# Patient Record
Sex: MALE | Race: BLACK OR AFRICAN AMERICAN | NOT HISPANIC OR LATINO
[De-identification: names, ages, dates, MRNs, and addresses within clinical notes are randomized per-mention and may not be internally consistent; named-entity substitution may affect disease eponyms.]

---

## 2022-06-24 ENCOUNTER — NON-APPOINTMENT (OUTPATIENT)
Age: 37
End: 2022-06-24

## 2022-06-24 PROBLEM — Z00.00 ENCOUNTER FOR PREVENTIVE HEALTH EXAMINATION: Status: ACTIVE | Noted: 2022-06-24

## 2022-06-30 ENCOUNTER — APPOINTMENT (OUTPATIENT)
Dept: PAIN MANAGEMENT | Facility: CLINIC | Age: 37
End: 2022-06-30

## 2022-08-18 ENCOUNTER — APPOINTMENT (OUTPATIENT)
Dept: NEUROLOGY | Facility: CLINIC | Age: 37
End: 2022-08-18
Payer: OTHER MISCELLANEOUS

## 2022-08-18 VITALS
WEIGHT: 290 LBS | SYSTOLIC BLOOD PRESSURE: 167 MMHG | DIASTOLIC BLOOD PRESSURE: 96 MMHG | HEART RATE: 97 BPM | HEIGHT: 73 IN | BODY MASS INDEX: 38.43 KG/M2

## 2022-08-18 PROCEDURE — 99214 OFFICE O/P EST MOD 30 MIN: CPT

## 2022-08-18 PROCEDURE — 99072 ADDL SUPL MATRL&STAF TM PHE: CPT

## 2022-08-18 NOTE — HISTORY OF PRESENT ILLNESS
[FreeTextEntry1] : Drake Champagne returns to the office for follow up and patient’s prior history and physical have been reviewed and he reports no change since last visit. He was initially seen with regards to lumbar radiculopathy sustained as result of work related injury on August 8, 2020. He has been doing chiropractor treatments over the last few weeks. It does offer temporary relief but unfortunately the symptoms returned the next day. He rates his pain 7 out of 10 but can go higher when doing extensive work. This affects his daily activity such as lifting, performing house chores. His walking has slightly improved, he can not walk a little more than 2 blocks at this time. Prolonged sitting worsens his pain. Patient follows up with pain management and had two rounds of lumbar epidural steroid injections with mild relief. \par \par Today, patient presents for neurologic revisit encounter regards to lumbar radiculopathy sustained as result of work related injury on August 8, 2020. The patient continues to have significant amount of pain. He rates his pain 7-8/10 on the pain scale. He was referred to neurosurgery but deferred, he wishes to proceed with conservative treatment at this time. He has been seeing Dr. Frey. He is set to undergo a bilateral L3-S1 medial branch block in the upcoming month. He has been trying Baclofen with good relief of pain.

## 2022-08-18 NOTE — PHYSICAL EXAM
[Person] : oriented to person [Place] : oriented to place [Time] : oriented to time [Concentration Intact] : normal concentrating ability [Visual Intact] : visual attention was ~T not ~L decreased [Naming Objects] : no difficulty naming common objects [Repeating Phrases] : no difficulty repeating a phrase [Writing A Sentence] : no difficulty writing a sentence [Fluency] : fluency intact [Comprehension] : comprehension intact [Reading] : reading intact [Past History] : adequate knowledge of personal past history [Cranial Nerves Optic (II)] : visual acuity intact bilaterally,  visual fields full to confrontation, pupils equal round and reactive to light [Cranial Nerves Oculomotor (III)] : extraocular motion intact [Cranial Nerves Trigeminal (V)] : facial sensation intact symmetrically [Cranial Nerves Facial (VII)] : face symmetrical [Cranial Nerves Vestibulocochlear (VIII)] : hearing was intact bilaterally [Cranial Nerves Glossopharyngeal (IX)] : tongue and palate midline [Cranial Nerves Accessory (XI - Cranial And Spinal)] : head turning and shoulder shrug symmetric [Cranial Nerves Hypoglossal (XII)] : there was no tongue deviation with protrusion [Motor Tone] : muscle tone was normal in all four extremities [Motor Strength] : muscle strength was normal in all four extremities [No Muscle Atrophy] : normal bulk in all four extremities [Sensation Tactile Decrease] : light touch was intact [Past-pointing] : there was no past-pointing [Tremor] : no tremor present [2+] : Ankle jerk left 2+ [Plantar Reflex Right Only] : normal on the right [Plantar Reflex Left Only] : normal on the left [FreeTextEntry6] :  pain limited weakness in the lower extremities pain bilateral, antalgic gait

## 2022-08-18 NOTE — ASSESSMENT
[FreeTextEntry1] : \par Drake Champagne returns to the office for follow up and patient’s prior history and physical have been reviewed and he reports no change since last visit. He was initially seen with regards to lumbar radiculopathy sustained as result of work related injury on August 8, 2020. He was referred to neurosurgery but deferred, he wishes to proceed with conservative treatment at this time. He has been seeing Dr. Frey. He is set to undergo a bilateral L3-S1 medial branch block in the upcoming month. He has been trying Baclofen with good relief of pain which he will continue with. He will continue with pain management and will consider neurosurgery.  \par \par \par  he is considered temporarily totally disabled and not recommended to go back to work at this time, and his symptom is causally related to the work related injury that he sustained.\par \par \par

## 2022-12-11 ENCOUNTER — FORM ENCOUNTER (OUTPATIENT)
Age: 37
End: 2022-12-11

## 2022-12-13 ENCOUNTER — FORM ENCOUNTER (OUTPATIENT)
Age: 37
End: 2022-12-13

## 2022-12-14 ENCOUNTER — FORM ENCOUNTER (OUTPATIENT)
Age: 37
End: 2022-12-14

## 2022-12-20 ENCOUNTER — APPOINTMENT (OUTPATIENT)
Dept: PAIN MANAGEMENT | Facility: CLINIC | Age: 37
End: 2022-12-20

## 2022-12-20 ENCOUNTER — APPOINTMENT (OUTPATIENT)
Dept: PAIN MANAGEMENT | Facility: CLINIC | Age: 37
End: 2022-12-20
Payer: OTHER MISCELLANEOUS

## 2022-12-20 ENCOUNTER — TRANSCRIPTION ENCOUNTER (OUTPATIENT)
Age: 37
End: 2022-12-20

## 2022-12-20 PROCEDURE — 00630 ANES PX LUMBAR REGION NOS: CPT | Mod: QZ,P2

## 2022-12-20 PROCEDURE — 93040 RHYTHM ECG WITH REPORT: CPT | Mod: 59

## 2022-12-20 PROCEDURE — 64495 INJ PARAVERT F JNT L/S 3 LEV: CPT | Mod: 50

## 2022-12-20 PROCEDURE — 64494 INJ PARAVERT F JNT L/S 2 LEV: CPT | Mod: 50

## 2022-12-20 PROCEDURE — 72100 X-RAY EXAM L-S SPINE 2/3 VWS: CPT

## 2022-12-20 PROCEDURE — 93770 DETERMINATION VENOUS PRESS: CPT

## 2022-12-20 PROCEDURE — 99072 ADDL SUPL MATRL&STAF TM PHE: CPT | Mod: QZ

## 2022-12-20 PROCEDURE — 99072 ADDL SUPL MATRL&STAF TM PHE: CPT

## 2022-12-20 PROCEDURE — 94761 N-INVAS EAR/PLS OXIMETRY MLT: CPT

## 2022-12-20 PROCEDURE — 64493 INJ PARAVERT F JNT L/S 1 LEV: CPT | Mod: 50

## 2022-12-20 NOTE — PROCEDURE
[FreeTextEntry3] : LUMBAR MEDIAL BRANCH INJECTION UNDER FLUOROSCOPY\par \par \par \par Date:  2022\par \par Patient: Leonard Juan\par \par :  1985\par \par ACC: [id number]\par \par \par \par Preoperative Diagnosis: Lumbar spondylosis; facet arthropathy L3-4, L4-5, L5-S1\par Postoperative Diagnosis: Same\par Procedure:\par                    1. Diagnostic Lumbar medial branch nerve injection Bilateral L3-4, L4-5, L5-S1\par                    2. Fluoroscopic guidance and localization of needle\par \par \par Physician: Trey Frey M.D.\par Anesthesiologist/CRNA: Ms Bernard\par Anesthesia: MAC /Versed 5 mg Fentanyl 50 mcg IVP\par Medical Necessity:  Failure of conservative management.\par \par \par \par Consent:  Though unusual, the possible complications including infection, bleeding, nerve damage, hospital admission, stroke, pneumothorax, death or failure of the procedure are theoretically possible. The patient was educated about the of the procedure and alternative therapies. All questions were answered and the patient freely gave consent to proceed.\par Indication for Fluoroscopy:  This procedure requires the precise placement of the spinal needle into the epidural space.  It is the only way to accurately and safely perform the injection.\par \par \par \par Procedure Note: \par After obtaining written consent, the patient was placed on the fluoroscopic table in the prone position with the pillow placed under the hips. A betadine prep was performed and the area surrounded by sterile drapes. A time out was performed.  Fluoroscopy unit was positioned over the patient and images of the spine (AP and oblique views) obtained.  The entry sites for the above noted levels median branch were determined and local anesthesia with lidocaine 2%-1cc was provided. At each level a 22guage 3 ½ inch spinal needle was placed at the level of the median branch to the facet under fluoroscopic guidance.  A dose of Lidocaine 2% 0.5cc was administered at each level the needles at each level were withdrawn following administration of the medication.  There was no significant bleeding at the site.  A dressing was placed by the assistant nurse. Contralateral side done in the same fashion.\par \par \par \par \par \par \par Complications: none. \par \par Disposition: : I have examined the patient and there are no new physical findings since the original presentation.  Sensory and motor function were intact. The patient met discharge criteria see nurses notes. The discharge instruction sheet was reviewed and given to the patient. The patient was discharged home with a .\par \par Comment: Immediate relief today : ADD PERCENTAGE. If at least 70% relief or 50% greater than 24 hours, would proceed to RFA. If 50% relief is less than 24 hours, would repeat to confirm for RFA. If less than 50% relief, assess for other pain generators. Call if any problems\par \par Indication for RFA: The pt had a positive response to medial branch diagnostic injections and is considered a good candidate for the thermal RF ablation procedure. The diagnostic injection provided at least 50% reduction in pain for the duration of the local anesthetic.\par \par \par \par The following criteria have been met: 1) failed response to at least 3 months of conservative management; 2) patient has LBP that is non-radicular, suggesting facet joint origin supported by absence of nerve root compression documented on the medical record on H&P and radiographic evaluation; 3) minimum of 6 months has elapsed since any prior RF treatments. If prior ablation therapy has been performed, it provided at least 50% relief for minimum of 10-12 weeks; 4) no prior spinal fusion at the vertebral level being treated;\par \par \par \par This document was signed by:\par \par Trey Frey MD \par Board Certified, Anesthesiology \par Board Certified, Pain Medicine \par \par

## 2023-01-03 ENCOUNTER — APPOINTMENT (OUTPATIENT)
Dept: PAIN MANAGEMENT | Facility: CLINIC | Age: 38
End: 2023-01-03
Payer: OTHER MISCELLANEOUS

## 2023-01-03 VITALS
WEIGHT: 290 LBS | DIASTOLIC BLOOD PRESSURE: 95 MMHG | HEART RATE: 71 BPM | HEIGHT: 73 IN | BODY MASS INDEX: 38.43 KG/M2 | SYSTOLIC BLOOD PRESSURE: 142 MMHG

## 2023-01-03 PROCEDURE — 99215 OFFICE O/P EST HI 40 MIN: CPT

## 2023-01-03 PROCEDURE — 99072 ADDL SUPL MATRL&STAF TM PHE: CPT

## 2023-01-03 NOTE — DATA REVIEWED
[FreeTextEntry1] : SOAPP: Scored a 0 , low risk.\par  \par [place] REGISTRY: Reviewed .  \par  \par UDS: No data obtained today. \par  \par Medications that trigger a UDS: Benzodiazepines (Ativan, Xanax, Valium) etc, Barbiturates, Narcotics (Avinza, Butrans, hydrocodone, Codeine, Zulema, Methadone, Morphine, MS Contin, Opana, oxycodone, Oxycontin, Suboxone etc), Pregabalin (Lyrica), Tramadol (Ultacet, Utram etc), Tapentadol, (Nucynta) and Elist Drugs (cocaine, THC, Etc.)\par  \par Risk factors: Bipolar Illness, positive for any an illicit drugs, history of any ETOH and drug abuse, any signs of diversion, Sharing Meds, selling meds. Non consistent [place] drug reporting and above 120meq of morphine\par  \par Low risk: Patient has combination of a low risk SOAP and no risk factors. UDS would be repeated randomly every quarter

## 2023-01-03 NOTE — ASSESSMENT
[FreeTextEntry1] : 37 year old male presenting with ongoing severe lower back pain. He has trialed and failed multiple injections. There may be a discogenic component. He is scheduled to see Dr. Valle in the end of January. He would like to see Dr. Valle first. Follow up in 6 weeks will be made for reassessment.\par \par Disability status:\par Temporary total disability. Patient is unable to return to work at this time.\par \par Entered by Elly Vargas, acting as scribe for Dr. Frey.\par  \par The documentation recorded by the scribe, in my presence, accurately reflects the service I personally performed, and the decisions made by me with my edits as appropriate.\par  \par Best Regards, \par Trey Frey MD \par Board Certified, Anesthesiology \par Board Certified, Pain Medicine\par \par

## 2023-01-03 NOTE — HISTORY OF PRESENT ILLNESS
[FreeTextEntry1] : HISTORY OF PRESENT ILLNESS, ORIGINAL: Mr. Juan is a 35 year old male presenting to establish care for his lower back pain. The pain started after a work related injury. 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 Since then, he has not returned to work. He has seen neurology and has had multiple medications with very minimal relief. He states the pain is severe, constant and rated 10/10 on the pain scale. He states the pain is associated with numbness and tingling into his right thigh as well as his lower extremities. He states he is unable to stand sit or walk. The patient has had this pain for 8 months. Patient describes the back pain as moderate to severe. During the last month the pain has been nearly constant with symptoms worsening evening. Pain described as sharp, pressure-like, dull/aching, throbbing. Pain is increased with standing, walking, exercising. Pain is decreased with lying down, relaxing. Pain is not changed with sitting, coughing/sneezing and bowel movements. Bowel or bladder habits have not changed.\par \par TODAY: Since last visit, he underwent a bilateral L3-S1 medial branch block on 12- with little to no relief. Today, he is presenting with ongoing severe lower back pain with radiation into the hamstring and legs. He notes some numbness and tingling as well. He states the pain is worse with sitting, standing or walking secondary to the pain. He states there is also pain with rotational movements. He currently rates the pain at a 8/10 on the pain scale. He states the pain is present daily.

## 2023-01-03 NOTE — PHYSICAL EXAM
[de-identified] : BACK - tenderness into the lumbar paraspinals. ROM restricted. Pain with extension. Positive facet loading bilaterally. Positive SLR bilaterally.

## 2023-01-15 ENCOUNTER — FORM ENCOUNTER (OUTPATIENT)
Age: 38
End: 2023-01-15

## 2023-01-25 ENCOUNTER — APPOINTMENT (OUTPATIENT)
Dept: NEUROSURGERY | Facility: CLINIC | Age: 38
End: 2023-01-25

## 2023-01-25 ENCOUNTER — APPOINTMENT (OUTPATIENT)
Dept: NEUROSURGERY | Facility: CLINIC | Age: 38
End: 2023-01-25
Payer: OTHER MISCELLANEOUS

## 2023-01-25 ENCOUNTER — APPOINTMENT (OUTPATIENT)
Dept: NEUROLOGY | Facility: CLINIC | Age: 38
End: 2023-01-25
Payer: OTHER MISCELLANEOUS

## 2023-01-25 ENCOUNTER — APPOINTMENT (OUTPATIENT)
Dept: NEUROLOGY | Facility: CLINIC | Age: 38
End: 2023-01-25

## 2023-01-25 VITALS
HEART RATE: 83 BPM | HEIGHT: 73 IN | BODY MASS INDEX: 39.76 KG/M2 | SYSTOLIC BLOOD PRESSURE: 152 MMHG | DIASTOLIC BLOOD PRESSURE: 97 MMHG | WEIGHT: 300 LBS

## 2023-01-25 VITALS — WEIGHT: 300 LBS | BODY MASS INDEX: 39.76 KG/M2 | HEIGHT: 73 IN

## 2023-01-25 DIAGNOSIS — Z78.9 OTHER SPECIFIED HEALTH STATUS: ICD-10-CM

## 2023-01-25 DIAGNOSIS — Z86.69 PERSONAL HISTORY OF OTHER DISEASES OF THE NERVOUS SYSTEM AND SENSE ORGANS: ICD-10-CM

## 2023-01-25 DIAGNOSIS — Z83.3 FAMILY HISTORY OF DIABETES MELLITUS: ICD-10-CM

## 2023-01-25 PROCEDURE — 99072 ADDL SUPL MATRL&STAF TM PHE: CPT

## 2023-01-25 PROCEDURE — 99214 OFFICE O/P EST MOD 30 MIN: CPT

## 2023-01-25 PROCEDURE — 99204 OFFICE O/P NEW MOD 45 MIN: CPT

## 2023-01-25 RX ORDER — IBUPROFEN 200 MG/1
200 TABLET ORAL
Refills: 0 | Status: ACTIVE | COMMUNITY

## 2023-01-25 RX ORDER — BACLOFEN 15 MG/1
TABLET ORAL
Refills: 0 | Status: ACTIVE | COMMUNITY

## 2023-01-25 NOTE — ASSESSMENT
[FreeTextEntry1] : lumbar radiculopathy- no further neurological testing at this time, follow up with neurosurgery since he has failed medical treatment.\par \par He is considered to have temporary total disability and is not recommended to go back to work at this time.\par \par I, Gloria Garcia, Attest that this documentation has been prepared under the direction and in the presence of Provider Aaron Rojas DO\par \par Thank You for letting me assist in the management of this patient. \par \par Aaron Rojas DO\kylee Board Certified, Neurology\par

## 2023-01-25 NOTE — ASSESSMENT
[FreeTextEntry1] : We have had a thorough discussion regarding his current condition, findings, and treatment options. Mr. APPIAH presents with persistent lower back pain with associated radiculopathy into the left leg.  He has trialed conservative management without improvement.  At this point he would like to consider surgical intervention.  I have recommended an updated MRI of the lumbar spine and dynamic x-rays of the lumbar spine for surgical planning.  Once the MRI and x-rays are completed he will follow-up with Dr. Valle to discuss further treatment options.  He is agreeable with our plan of care.  He will continue to see Dr. Frey and Dr. Rojas in the interim. All questions answered today.\par \par Jade Falcon, MS MANTILLA\par Hang Valle MD\par

## 2023-01-25 NOTE — HISTORY OF PRESENT ILLNESS
[de-identified] : Mr. APPIAH is status post work-related injury on 8/4/2020.  Since the injury he has had persistent mechanical lower back pain.  At times he will experience radiculopathy affecting his left leg.  He reports cramping distally in the left leg also.  Symptoms worsen when he gets up from a seated position or with bending.  Sitting he is relatively comfortable.\par \par He has trialed extensive physical therapy and interventional procedures with pain management.  He was seen by Dr. Frey, lumbar epidural injections followed by lumbar medial branch blocks were performed.  He had no relief with medial branch injections.  Epidurals gave him temporary relief. He is medically managed on baclofen and ibuprofen as needed. No bowel / bladder dysfunction.\par \par We have reviewed an MRI of the lumbar spine from stand-up MRI March 2021.  He is found to have a isolated degenerative disc at L5-S1.  No recent imaging.\par \par PHYSICAL EXAM: \par Constitutional: Well appearing, no distress\par HEENT: Normocephalic Atraumatic\par Psychiatric: Alert and oriented to all spheres, normal mood\par Pulmonary: No respiratory distress\par Neurologic: \par CN II-XII grossly intact\par Palpation: + Tenderness lumbar paraspinals.\par Strength: Full strength in all major muscle groups\par Sensation: Full sensation to light touch in all extremities\par Reflexes: \par  2+ patellar\par  2+ ankle jerk\par Restriction in range of motion lumbar spine.  Pain with flexion and extension.\par SLR negative\par Gait: steady, walking w/o assistance. \par \par

## 2023-01-25 NOTE — HISTORY OF PRESENT ILLNESS
[FreeTextEntry1] : Mr. YISSEL APPIAH returns to the office for follow-up and his prior history and physical have been reviewed and he reports no change since last visit.  he has been seeing us for  lumbar radiculopathy sustained as result of work related injury on August 8, 2022. He has not improved despite physical therapy and pain management injection/nerve blocks.  He still complains of daily back pain rated at 9-10/10.  he has been sent to neurosurgery and he wants to go ahead with surgery since medical treatment has not helpe.  he has not worked since the injury.

## 2023-02-13 ENCOUNTER — LABORATORY RESULT (OUTPATIENT)
Age: 38
End: 2023-02-13

## 2023-02-13 ENCOUNTER — APPOINTMENT (OUTPATIENT)
Dept: PAIN MANAGEMENT | Facility: CLINIC | Age: 38
End: 2023-02-13
Payer: OTHER MISCELLANEOUS

## 2023-02-13 VITALS — BODY MASS INDEX: 39.76 KG/M2 | HEIGHT: 73 IN | WEIGHT: 300 LBS

## 2023-02-13 PROCEDURE — 99215 OFFICE O/P EST HI 40 MIN: CPT

## 2023-02-13 PROCEDURE — 80104W: CUSTOM

## 2023-02-13 PROCEDURE — 99072 ADDL SUPL MATRL&STAF TM PHE: CPT

## 2023-02-13 PROCEDURE — 80305 DRUG TEST PRSMV DIR OPT OBS: CPT | Mod: QW

## 2023-02-13 RX ORDER — TRAMADOL HYDROCHLORIDE 50 MG/1
50 TABLET, COATED ORAL TWICE DAILY
Qty: 60 | Refills: 0 | Status: ACTIVE | COMMUNITY
Start: 2023-02-13 | End: 1900-01-01

## 2023-02-13 NOTE — HISTORY OF PRESENT ILLNESS
[FreeTextEntry1] : HISTORY OF PRESENT ILLNESS, ORIGINAL: Mr. Juan is a 35 year old male presenting to establish care for his lower back pain. The pain started after a work related injury. 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 Since then, he has not returned to work. He has seen neurology and has had multiple medications with very minimal relief. He states the pain is severe, constant and rated 10/10 on the pain scale. He states the pain is associated with numbness and tingling into his right thigh as well as his lower extremities. He states he is unable to stand sit or walk. The patient has had this pain for 8 months. Patient describes the back pain as moderate to severe. During the last month the pain has been nearly constant with symptoms worsening evening. Pain described as sharp, pressure-like, dull/aching, throbbing. Pain is increased with standing, walking, exercising. Pain is decreased with lying down, relaxing. Pain is not changed with sitting, coughing/sneezing and bowel movements. Bowel or bladder habits have not changed.\par \par TODAY: Since last visit, he continues with ongoing lower back pain. He states the pain is in the back with radiation into the lower extremities. He does note at times numbness and tingling into the legs. He states there is significant pain with standing or walking. \par \par He currently rates his pain anywhere from a 6 to 9/10 on the pain scale.  He states he recently saw neurosurgery and is pending imaging.  He has severe limitation of his ADLs.  He is very frustrated by his symptoms.

## 2023-02-13 NOTE — ASSESSMENT
[FreeTextEntry1] : 37-year-old male presenting with chronic lower back pain.  He has failed multiple injections, multiple medications and physical therapy.  At this time, we will trial tramadol 50 mg to be taken b.i.d..  I will also get a UDS today.  I will defer care to Neurosurgery for additional treatment.  He will follow-up in 2 months for reassessment.  \par \par Disability status:\par Temporary total disability. Patient is unable to return to work at this time.\par \par Entered by Elly Vargas, acting as scribe for Dr. Frey.\par  \par The documentation recorded by the scribe, in my presence, accurately reflects the service I personally performed, and the decisions made by me with my edits as appropriate.\par  \par Best Regards, \par Trey Frey MD \par Board Certified, Anesthesiology \par Board Certified, Pain Medicine\par \par

## 2023-02-13 NOTE — PHYSICAL EXAM
[de-identified] : BACK - tenderness into the lumbar paraspinals. ROM restricted. Pain with extension. Positive facet loading bilaterally. Positive SLR bilaterally.

## 2023-02-14 LAB
AMP / AMPHETAMINE: NEGATIVE
BAR / SECOBARBITAL: NEGATIVE
BUP / BUPRENORPHINE: NEGATIVE
BZO / OXAZEPAM: NEGATIVE
COC / COCAINE: NEGATIVE
CREATININE: 200 MG/DL
MDMA / METHYLENEDIOXYMETHAMPHETAMINE: NEGATIVE
MET / METHAMPHETAMINES: NEGATIVE
MOP / MORPHINE: NEGATIVE
MTD / METHADONE: NEGATIVE
OXY / OXYCODONE: NEGATIVE
PCP / PHENCYCLIDINE: NEGATIVE
PH: 7
SPECIFIC GRAVITY: 1.02
TEMPERATURE: 90 C
THC / MARIJUANA: NEGATIVE

## 2023-02-26 ENCOUNTER — OUTPATIENT (OUTPATIENT)
Dept: OUTPATIENT SERVICES | Facility: HOSPITAL | Age: 38
LOS: 1 days | End: 2023-02-26
Payer: COMMERCIAL

## 2023-02-26 ENCOUNTER — RESULT REVIEW (OUTPATIENT)
Age: 38
End: 2023-02-26

## 2023-02-26 DIAGNOSIS — M54.16 RADICULOPATHY, LUMBAR REGION: ICD-10-CM

## 2023-02-26 DIAGNOSIS — M47.816 SPONDYLOSIS WITHOUT MYELOPATHY OR RADICULOPATHY, LUMBAR REGION: ICD-10-CM

## 2023-02-26 PROCEDURE — 72148 MRI LUMBAR SPINE W/O DYE: CPT

## 2023-02-26 PROCEDURE — 72148 MRI LUMBAR SPINE W/O DYE: CPT | Mod: 26

## 2023-02-27 DIAGNOSIS — M47.816 SPONDYLOSIS WITHOUT MYELOPATHY OR RADICULOPATHY, LUMBAR REGION: ICD-10-CM

## 2023-02-27 DIAGNOSIS — M54.16 RADICULOPATHY, LUMBAR REGION: ICD-10-CM

## 2023-03-21 ENCOUNTER — APPOINTMENT (OUTPATIENT)
Dept: NEUROSURGERY | Facility: CLINIC | Age: 38
End: 2023-03-21
Payer: OTHER MISCELLANEOUS

## 2023-03-21 PROCEDURE — 99214 OFFICE O/P EST MOD 30 MIN: CPT

## 2023-03-21 NOTE — HISTORY OF PRESENT ILLNESS
[de-identified] : Mr. Juan is a 37 year old male presenting to establish care for his lower back pain. The pain started after a work related injury 08/04/2020. 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 Since then, he has not returned to work. Since the injury he has had persistent left sided lower back pain. At times he will experience radiculopathy affecting his posterior left leg. Symptoms worsen when he gets up from a seated position or with sitting for long periods of time. \par \par He has trialed extensive physical therapy and interventional procedures with pain management. He was seen by Dr. Frey, lumbar epidural injections followed by lumbar medial branch blocks were performed. He had no relief with medial branch injections. Epidurals gave him temporary relief. \par \par MRI Lumbar Spine 02/26/23: Degenerative disc at L5-S1 with little or no loss of disc height. Mild retrolisthesis at L5-S1. Mild to moderate foraminal stenosis at L5-S1, somewhat worse on the right than left.

## 2023-03-21 NOTE — DISCUSSION/SUMMARY
[de-identified] : Mr. Juan is a 37 year old male presenting to establish care for his lower back pain. The pain started after a work related injury 08/04/2020. Though there are some L5-S1 findings noted, he has undergone multiple lumbar injections without any relief. I am beginning to suspect pain stemming from the left SI joint region. I am referring him to pain management for possible diagnostic left SI joint injection to rule out left sacroiliitis. If successful RFA can be considered. I will see him back once completed. \par \par I, Quentin Sol, attest that this documentation has been prepared under the direction and in the presence of Provider Hang Valle MD\par  \par Thank you for allowing me to assist in the care of this patient. \par  \par Hang Valle MD\par \par Board Certified , Neurosurgeon\par \par

## 2023-03-21 NOTE — PHYSICAL EXAM
[de-identified] : Palpation: + Tenderness lumbar paraspinals.\par Strength: Full strength in all major muscle groups\par Sensation: Full sensation to light touch in all extremities\par Reflexes: \par  2+ patellar\par  2+ ankle jerk\par Restriction in range of motion lumbar spine. Pain with flexion and extension.\par SLR positive on the left\par Positive rubi sign on the left\par Positive william sign on the left \par Gait: steady, walking w/o assistance. \par

## 2023-03-22 ENCOUNTER — APPOINTMENT (OUTPATIENT)
Dept: NEUROLOGY | Facility: CLINIC | Age: 38
End: 2023-03-22

## 2023-04-11 ENCOUNTER — FORM ENCOUNTER (OUTPATIENT)
Age: 38
End: 2023-04-11

## 2023-04-11 ENCOUNTER — APPOINTMENT (OUTPATIENT)
Dept: PAIN MANAGEMENT | Facility: CLINIC | Age: 38
End: 2023-04-11
Payer: OTHER MISCELLANEOUS

## 2023-04-11 VITALS
WEIGHT: 300 LBS | BODY MASS INDEX: 39.76 KG/M2 | HEART RATE: 84 BPM | SYSTOLIC BLOOD PRESSURE: 136 MMHG | HEIGHT: 73 IN | DIASTOLIC BLOOD PRESSURE: 86 MMHG

## 2023-04-11 PROCEDURE — 99215 OFFICE O/P EST HI 40 MIN: CPT

## 2023-04-11 NOTE — ASSESSMENT
[FreeTextEntry1] : 37-year-old male presenting with signs and symptoms consistent with left sacroiliac joint dysfunction. Patient is presenting with mainly buttock pain with impairment in ADLs and functionality pointing to the left sacroiliac joint.  The pain has not responded to conservative care, including medications, stretching, as well as active modalities, such as physical therapy.  Imaging studies as well as physical exam findings corroborate the symptomatology and point to the sacroiliac joints.  We will proceed with diagnostic and therapeutic sacroiliac joint injection. Follow up in 6 weeks will be made for reassessment. I have explained the findings to the patient and all questions have been answered. \par \par Patient had pain on PSIS area, Kade’s positive and pelvic rocking positive. Patient has trialed rehab (Home exercise, physical therapy or chiropractic care) and medications. We will schedule a left diagnostic and therapeutic sacroiliac joint injection. If greater than 50% relief for greater than 30 minutes, would do a second left sacroiliac joint injection in 1-2 weeks. With greater than 50% relief for 6-8 weeks, a left sacroiliac joint injection could be repeated in 3 months vs RFA or referral to neurosurgeon. \par \par Risk, benefits, pros and cons of procedure were explained to the patient using models and diagrams and their questions were answered. \par \par \par The patient has severe anxiety of procedures that necessitates monitored anesthesia care (MAC). The procedure performed will be close to major nerves, arteries, and spinal cord and/or joint structures. Due to the proximity of these structures, we need the patient to be still during the procedure.  With the help of MAC, this will be safely achieved and decrease the risk of any complications.\par \par \par Disability status:\par Temporary total disability. Patient is unable to return to work at this time.\par \par Entered by Elly Vargas, acting as scribe for Dr. Frey.\par  \par The documentation recorded by the scribe, in my presence, accurately reflects the service I personally performed, and the decisions made by me with my edits as appropriate.\par  \par Best Regards, \par Trey Frey MD \par Board Certified, Anesthesiology \par Board Certified, Pain Medicine\par \par

## 2023-04-11 NOTE — HISTORY OF PRESENT ILLNESS
[FreeTextEntry1] : HISTORY OF PRESENT ILLNESS, ORIGINAL: Mr. Juan is a 35 year old male presenting to establish care for his lower back pain. The pain started after a work related injury. 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 Since then, he has not returned to work. He has seen neurology and has had multiple medications with very minimal relief. He states the pain is severe, constant and rated 10/10 on the pain scale. He states the pain is associated with numbness and tingling into his right thigh as well as his lower extremities. He states he is unable to stand sit or walk. The patient has had this pain for 8 months. Patient describes the back pain as moderate to severe. During the last month the pain has been nearly constant with symptoms worsening evening. Pain described as sharp, pressure-like, dull/aching, throbbing. Pain is increased with standing, walking, exercising. Pain is decreased with lying down, relaxing. Pain is not changed with sitting, coughing/sneezing and bowel movements. Bowel or bladder habits have not changed.\par \par TODAY: Since last visit, he continues with ongoing lower back pain. He recently saw Dr. Valle as was advised to get a SIJ injection.\par \par He continues today with ongoing severe lower back/buttock pain. He states the pain radiates into the groin and into the hamstrings. He has stiffness and spasms. He has difficulty with sitting or standing for extended periods of time. He rates the pain at a 9/10 on the pain scale.

## 2023-05-02 ENCOUNTER — APPOINTMENT (OUTPATIENT)
Dept: PAIN MANAGEMENT | Facility: CLINIC | Age: 38
End: 2023-05-02
Payer: OTHER MISCELLANEOUS

## 2023-05-02 PROCEDURE — 94761 N-INVAS EAR/PLS OXIMETRY MLT: CPT

## 2023-05-02 PROCEDURE — 93770 DETERMINATION VENOUS PRESS: CPT

## 2023-05-02 PROCEDURE — 93040 RHYTHM ECG WITH REPORT: CPT | Mod: 59

## 2023-05-02 PROCEDURE — 27096 INJECT SACROILIAC JOINT: CPT | Mod: LT

## 2023-05-02 PROCEDURE — 01992 ANES DX/THER NRV BLK&INJ PRN: CPT | Mod: QZ,P2

## 2023-05-02 PROCEDURE — 72200 X-RAY EXAM SI JOINTS: CPT

## 2023-05-02 NOTE — PROCEDURE
[FreeTextEntry1] : SACROILIAC JOINT INJECTION UNDER FLUOROSCOPY\par   [FreeTextEntry3] : SACROILIAC JOINT INJECTION UNDER FLUOROSCOPY\par \par \par \par \par Date:  2023\par \par Patient: Leonard Juan\par \par :  1985\par Preoperative Diagnosis: Left SIJ Arthropathy \par Procedure:  Left SIJ Injection under Fluoroscopy\par Physician: Trey Frey MD\par Anesthesiologist/CRNA: Mr. Combs \par Anesthesia: See nurses note/ MAC/cold spray IV Sedation versed 4mg, fentanyl 100mcg \par Medical Necessity:  Failure of conservative management.\par Indications:  The patient was admitted for a median branch injection for diagnostic purposes.  The patient was explained the technique, complications and rationale for the procedure and elected to proceed.\par Indication for Fluoroscopy:  This procedure requires the precise placement of the spinal needle.  It is the only way to accurately and safely perform the injection.\par \par \par \par \par \par CONSENT: The possible complications including infection, bleeding, nerve damage, Hospital admission, death or failure of the procedure; though unusual, are theoretically possible. The patient was educated about the of the procedure and alternative therapies. All questions were answered and the patient freely gave consent to proceed.\par \par \par \par \par \par Monitoring:  Patient had continuous blood pressure, EKG, and pulse oximetry throughout the case. See nurse's notes\par \par \par \par \par PROCEDURE NOTE: \par After obtaining written consent, the patient was placed on the fluoroscopic table in the prone position  A time out was performed.  Fluoroscopic guidance was used to isolate and identify the Left sacroiliac joint.  A medial to lateral oblique projection allowed separation of the anterior (lateral) and posterior (medial) joint space.  The sacrum and posterior iliac crest was prepped with chloraprep and draped in usual sterile fashion. Cold spray was used to localize the area. A 22-gauge 3.5 inch spinal needle was directed into the inferior aspect of the sacroiliac joint using a posterior approach in bull's eye fashion. The interosseous ligament was engaged which provoked responses consisting of her typical painful symptoms. A 2 cc total volume of lidocaine 2% and 1 ml dexamethasone 10mg was injected. Volumes were kept small-commensurate with the joint's capacity as determined by end-injection back pressure on the syringe. The needle was removed. \par \par \par \par \par Findings: SIJ spine AP and oblique views with x-ray degenerative changes noted.\par \par \par Complications: none.  \par \par Disposition: I have examined the patient and there are no new physical findings since the original presentation.  Sensory and motor function were intact. The patient met discharge criteria see nurses' notes. The discharge instruction sheet was reviewed and given to the patient. The patient was discharged home with a .\par \par Comment: If at least 70% relief or 50% greater than 24 hours, would proceed to RFA. If 50% relief is less than 24 hours, would repeat to confirm for RFA. If less than 50% relief, assess for other pain generators. Call if any problems\par \par \par \par \par Indication for RFA: The pt had a positive response to SIJ injections and is considered a good candidate for the thermal RF ablation procedure. The diagnostic injection provided at least 50% reduction in pain for the duration of the local anesthetic. \par \par \par \par \par \par The following criteria have been met: 1) failed response to at least 3 months of conservative management; 2) patient has LBP/SIJ pain that is non-radicular, suggesting SIJ joint origin supported by absence of nerve root compression documented on the medical record on H&P and radiographic evaluation; 3) minimum of 6 months has elapsed since any prior RF treatments. If prior ablation therapy has been performed, it provided at least 50% relief for minimum of 10-12 weeks; 4) no prior spinal fusion at the vertebral level being treated;\par \par \par \par \par This document was signed by:\par \par Trey Frey MD  \par Board Certified, Anesthesiology  \par Board Certified, Pain Medicine  \par \par

## 2023-05-31 ENCOUNTER — FORM ENCOUNTER (OUTPATIENT)
Age: 38
End: 2023-05-31

## 2023-06-13 ENCOUNTER — APPOINTMENT (OUTPATIENT)
Dept: PAIN MANAGEMENT | Facility: CLINIC | Age: 38
End: 2023-06-13
Payer: OTHER MISCELLANEOUS

## 2023-06-13 ENCOUNTER — FORM ENCOUNTER (OUTPATIENT)
Age: 38
End: 2023-06-13

## 2023-06-13 VITALS — BODY MASS INDEX: 39.76 KG/M2 | WEIGHT: 300 LBS | HEIGHT: 73 IN

## 2023-06-13 PROCEDURE — 99214 OFFICE O/P EST MOD 30 MIN: CPT

## 2023-06-13 NOTE — PHYSICAL EXAM
[de-identified] : BACK - tenderness over the SIJ. ROM restricted. Pain with extension. Positive PSIS. Positive patricks test. Positive thigh thrust.

## 2023-06-13 NOTE — HISTORY OF PRESENT ILLNESS
[FreeTextEntry1] : HISTORY OF PRESENT ILLNESS, ORIGINAL: Mr. Juan is a 35 year old male presenting to establish care for his lower back pain. The pain started after a work related injury. 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 Since then, he has not returned to work. He has seen neurology and has had multiple medications with very minimal relief. He states the pain is severe, constant and rated 10/10 on the pain scale. He states the pain is associated with numbness and tingling into his right thigh as well as his lower extremities. He states he is unable to stand sit or walk. The patient has had this pain for 8 months. Patient describes the back pain as moderate to severe. During the last month the pain has been nearly constant with symptoms worsening evening. Pain described as sharp, pressure-like, dull/aching, throbbing. Pain is increased with standing, walking, exercising. Pain is decreased with lying down, relaxing. Pain is not changed with sitting, coughing/sneezing and bowel movements. Bowel or bladder habits have not changed.\par \par TODAY: Since last visit, he underwent a left sacroiliac joint injection on 5-2-2023 With approximately 90% relief for 3 weeks.  He states he was able to sit and sleep better after this injection.  Currently, he states his left buttock pain is returned.  He currently rates the pain as an 8/10 on the pain scale.  He states the pain goes into the left groin.  He states he has numbness and tingling over the left buttock.  He continues home exercises.\par

## 2023-06-13 NOTE — ASSESSMENT
[FreeTextEntry1] : 37-year-old male presenting status post left sacroiliac joint injection with approximately 90% relief for 3 weeks.  He states he was able to sit and sleep better following this procedure.  Currently, he states his left buttock pain is returned.  I will now proceed with a left sacroiliac joint radiofrequency ablation with sedation.  He will follow-up in 6 weeks for reassessment.\par \par \par Risk, benefits, pros and cons of procedure were explained to the patient using models and diagrams and their questions were answered. \par \par \par The patient has severe anxiety of procedures that necessitates monitored anesthesia care (MAC). The procedure performed will be close to major nerves, arteries, and spinal cord and/or joint structures. Due to the proximity of these structures, we need the patient to be still during the procedure.  With the help of MAC, this will be safely achieved and decrease the risk of any complications.\par \par \par Disability status:\par Temporary total disability. Patient is unable to return to work at this time.\par \par Entered by Elly Vargas, acting as scribe for Dr. Frey.\par  \par The documentation recorded by the scribe, in my presence, accurately reflects the service I personally performed, and the decisions made by me with my edits as appropriate.\par  \par Best Regards, \par Trey Frey MD \par Board Certified, Anesthesiology \par Board Certified, Pain Medicine\par \par

## 2023-07-05 ENCOUNTER — APPOINTMENT (OUTPATIENT)
Dept: PAIN MANAGEMENT | Facility: CLINIC | Age: 38
End: 2023-07-05
Payer: OTHER MISCELLANEOUS

## 2023-07-05 PROCEDURE — 01992 ANES DX/THER NRV BLK&INJ PRN: CPT | Mod: QZ,P2

## 2023-07-05 PROCEDURE — 64636Z: CUSTOM

## 2023-07-05 PROCEDURE — 94761 N-INVAS EAR/PLS OXIMETRY MLT: CPT

## 2023-07-05 PROCEDURE — 93770 DETERMINATION VENOUS PRESS: CPT

## 2023-07-05 PROCEDURE — 64635 DESTROY LUMB/SAC FACET JNT: CPT | Mod: LT

## 2023-07-05 PROCEDURE — 93040 RHYTHM ECG WITH REPORT: CPT | Mod: 59

## 2023-07-05 PROCEDURE — 72100 X-RAY EXAM L-S SPINE 2/3 VWS: CPT

## 2023-07-05 NOTE — PROCEDURE
[FreeTextEntry1] : Sacroiliac Radiofrequency ablation under fluoroscopy [FreeTextEntry3] :  Date:  2023\par \par Patient: Leonard Juan\par \par :  1985\par \par \par \par \par \par Preoperative diagnosis:  Left Sacroilitis\par Postoperative Diagnosis: Same\par Procedure:   Left medial nerve branch Radiofrequency Ablation for sacroiliac denervation under fluoroscopy.\par Physician: Trey Frey MD \par Anesthesiologist/CRNA: Ms. Farr\par Anesthesia:. MAC, Versed 4mg Fent 150mcg\par Medical necessity: Failure of conservative medical management\par Indication for Fluoroscopy:  This procedure requires the precise placement of the spinal needle into the epidural space.  It is the only way to accurately and safely perform the injection.\par \par \par \par Consent:  Though unusual, the possible complications including infection, bleeding, nerve damage, hospital admission, stroke, death or failure of the procedure are theoretically possible. The patient was educated about the of the procedure and alternative therapies. All questions were answered and the patient freely gave consent to proceed.\par Prior to the procedure, we discussed the risks of the radiofrequency such as hematoma, infection, and nerve or spinal cord injury. The patient was also told that sometimes patients will notice lower extremity weakness immediately following the procedure due to extravasation of local anesthetic solution onto the main nerve root during the procedure. In addition, the patient was informed that 1 to 2 weeks of perioperative discomfort following the procedure is to be expected.\par \par \par \par Monitoring:  Patient had continuous blood pressure, EKG, and pulse oximetry throughout the case. See nurse's notes.\par \par \par \par PROCEDURE NOTE: \par After obtaining written consent, patient was placed in a prone position on a fluoroscopy table.  The back was prepped and draped in a sterile fashion. A time out was performed. The [employer]  mm disposable Aravind*-coated cannula with a 5 mm active tip was adjusted via the sizing collar so that the electrode fit just inside the inner bevel at the end of the bare metal. Prior to beginning the procedure, the internal test mode function of the radiofrequency unit was checked to make sure the machine was functioning properly.\par   \par Fluoroscopic guidance was used to isolate and identify the left sacroiliac joint.  The skin was prepped.  At each target site, a 1% Lidocaine solution is used to anesthetize the skin and subcutaneous tissues. The radiofrequency cannulas were then placed at the L5 medial branch and the S1 and S2 and S3 posterior primary rami. Sensory stimulation at 50 hertz was performed at each target area. Referral of the sensory stimulation was noted at less than 1 volt over the paravertebral area or hip. Motor dissociation at 2 hertz was obtained by stimulating up to 3 times the voltage of the sensory stimulation and insuring a lack of motor movement in the lower extremities.\par \par Once the radiofrequency cannula were in correct position, a 2% Lidocaine solution was used to rapidly anesthetize the lesion site. After a thirty second delay, thermal lesions were then created at each level for a duration of 90 seconds at a temperature of 80° C.  A 3 cc volume of 2 lidocaine (2 cc) and depomedrol 40mg (1 cc) was then injected through the needle into the joint and the needle was removed. Sterile bandages are placed at the puncture sites. The patient tolerated the procedure well.  The patient's back was cleaned, and she was placed in the sitting position. \par \par \par \par Findings: Sacroiliac spine AP and oblique views with x-ray degenerative changes noted.\par \par Complications: None. The patient tolerated the procedure well.\par \par Disposition: I have examined the patient and there are no new physical findings since the original presentation.  Sensory and motor function were intact. The patient met discharge criteria see nurses' notes. The discharge instruction sheet was reviewed and given to the patient. The patient was discharged home with a . \par \par Comment: RFA today, follow up in 2-4 weeks. Call if any problem.\par \par \par \par Indication for RFA: The pt had a positive response to medial branch diagnostic injections and is considered a good candidate for the thermal RF ablation procedure. The diagnostic injection provided at least 50% reduction in pain for the duration of the local anesthetic. \par \par \par \par The following criteria have been met: 1) failed response to at least 3 months of conservative management; 2) patient has LBP/SIJ that is non-radicular, suggesting facet joint origin supported by absence of nerve root compression documented on the medical record on H&P and radiographic evaluation; 3) minimum of 6 months has elapsed since any prior RF treatments. If prior ablation therapy has been performed, it provided at least 50% relief for minimum of 10-12 weeks; 4) no prior spinal fusion at the vertebral level being treated;\par \par \par \par \par Trey Frey MD \par Board Certified, Anesthesiology \par Board Certified, Pain Medicine  \par

## 2023-07-18 ENCOUNTER — APPOINTMENT (OUTPATIENT)
Dept: PAIN MANAGEMENT | Facility: CLINIC | Age: 38
End: 2023-07-18
Payer: OTHER MISCELLANEOUS

## 2023-07-18 PROCEDURE — 99075 MEDICAL TESTIMONY: CPT

## 2023-07-26 ENCOUNTER — APPOINTMENT (OUTPATIENT)
Dept: PAIN MANAGEMENT | Facility: CLINIC | Age: 38
End: 2023-07-26

## 2023-08-07 ENCOUNTER — APPOINTMENT (OUTPATIENT)
Dept: PAIN MANAGEMENT | Facility: CLINIC | Age: 38
End: 2023-08-07
Payer: OTHER MISCELLANEOUS

## 2023-08-07 DIAGNOSIS — M47.816 SPONDYLOSIS W/OUT MYELOPATHY OR RADICULOPATHY, LUMBAR REGION: ICD-10-CM

## 2023-08-07 PROCEDURE — 99215 OFFICE O/P EST HI 40 MIN: CPT

## 2023-08-07 NOTE — ASSESSMENT
[FreeTextEntry1] : 38 year old male presenting with lumbar facet mediated pain. Patient is presenting with mainly axial pain with impairment in ADLs and functionality pointing to facet joints.  The pain has not responded to conservative care, including medications, stretching, as well as active modalities, such as physical therapy.  Imaging studies as well as physical exam findings corroborate the symptomatology and point to the facet joints.  We will proceed with diagnostic medial branch blocks. Follow up in 6 weeks will be made for reassessment. I have explained the findings to the patient and all questions have been answered.   Patient had paravertebral tenderness and pain on spinal movement with facet loading. Patient has trialed rehab (Home exercise, physical therapy or chiropractic care) and medications.   Schedule a bilateral diagnostic lumbar medial branch injection L3-S1, if 70% immediate relief for greater than 30 minutes or 50% greater than 24 hours, would schedule RFA at  lumbar medial branches.  If greater than 50% intermediate relief for 30 minutes would schedule a second diagnostic lumbar medial branch block, and if greater than 50% intermediate relief for 30 minutes, would schedule a RFA at lumbar medial branches.   Risk, benefits, pros and cons of procedure were explained to the patient using models and diagrams and their questions were answered.    The patient has severe anxiety of procedures that necessitates monitored anesthesia care (MAC). The procedure performed will be close to major nerves, arteries, and spinal cord and/or joint structures. Due to the proximity of these structures, we need the patient to be still during the procedure.  With the help of MAC, this will be safely achieved and decrease the risk of any complications.  Disability status: Temporary total disability. Patient is unable to return to work at this time.    Entered by Elly Vargas, acting as scribe for Dr. Frey.   The documentation recorded by the scribe, in my presence, accurately reflects the service I personally performed, and the decisions made by me with my edits as appropriate.   Best Regards,  Trey Frey MD  Board Certified, Anesthesiology  Board Certified, Pain Medicine

## 2023-08-07 NOTE — HISTORY OF PRESENT ILLNESS
[FreeTextEntry1] : HISTORY OF PRESENT ILLNESS, ORIGINAL: Mr. Juan is a 35 year old male presenting to establish care for his lower back pain. The pain started after a work related injury. 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 Since then, he has not returned to work. He has seen neurology and has had multiple medications with very minimal relief. He states the pain is severe, constant and rated 10/10 on the pain scale. He states the pain is associated with numbness and tingling into his right thigh as well as his lower extremities. He states he is unable to stand sit or walk. The patient has had this pain for 8 months. Patient describes the back pain as moderate to severe. During the last month the pain has been nearly constant with symptoms worsening evening. Pain described as sharp, pressure-like, dull/aching, throbbing. Pain is increased with standing, walking, exercising. Pain is decreased with lying down, relaxing. Pain is not changed with sitting, coughing/sneezing and bowel movements. Bowel or bladder habits have not changed.  TODAY: Since last visit, he underwent a left sacroiliac joint radiofrequency ablation on 7/5/2023. He had approximately 50% relief.  Currently, he states he has mainly axial lower back pain.  He currently rates pain as a 9 out of 10 on the pain scale.  He states pain is across the lower back only without any radicular component.  He states there is severe stiffness and spasms.  He has no radiating symptoms.  He states pain limits him from rotating in any direction.

## 2023-08-07 NOTE — PHYSICAL EXAM
[de-identified] : BACK - tenderness into the lumbar paraspinals. ROM restricted. Pain with extension. Positive facet loading.

## 2023-08-10 ENCOUNTER — APPOINTMENT (OUTPATIENT)
Dept: PAIN MANAGEMENT | Facility: CLINIC | Age: 38
End: 2023-08-10
Payer: OTHER MISCELLANEOUS

## 2023-08-10 PROCEDURE — 64493 INJ PARAVERT F JNT L/S 1 LEV: CPT | Mod: RT

## 2023-08-10 PROCEDURE — 94761 N-INVAS EAR/PLS OXIMETRY MLT: CPT

## 2023-08-10 PROCEDURE — 93770 DETERMINATION VENOUS PRESS: CPT

## 2023-08-10 PROCEDURE — 00630 ANES PX LUMBAR REGION NOS: CPT | Mod: QZ,P2

## 2023-08-10 PROCEDURE — 93040 RHYTHM ECG WITH REPORT: CPT | Mod: 59

## 2023-08-10 PROCEDURE — 72100 X-RAY EXAM L-S SPINE 2/3 VWS: CPT

## 2023-08-10 PROCEDURE — 64494 INJ PARAVERT F JNT L/S 2 LEV: CPT | Mod: RT

## 2023-08-10 PROCEDURE — 64495 INJ PARAVERT F JNT L/S 3 LEV: CPT | Mod: RT

## 2023-08-10 NOTE — PROCEDURE
[FreeTextEntry1] : LUMBAR MEDIAL BRANCH INJECTION UNDER FLUOROSCOPY [FreeTextEntry3] : LUMBAR MEDIAL BRANCH INJECTION UNDER FLUOROSCOPY      Preoperative Diagnosis: Lumbar spondylosis; facet arthropathy Bilateral L3-4, L4-5, L5-S1 Postoperative Diagnosis: Same Procedure:                    1. Diagnostic Lumbar medial branch nerve injection Bilateral L3-4, L4-5, L5-S1                    2. Fluoroscopic guidance and localization of needle   Physician: Trey Frey M.D. Anesthesiologist/CRNA: Verena Baltazar  Anesthesia: See nurses note. MAC/Cold spray/ Versed 4mg, Fentanyl 100mcg  Medical Necessity:  Failure of conservative management.    Consent:  Though unusual, the possible complications including infection, bleeding, nerve damage, hospital admission, stroke, pneumothorax, death or failure of the procedure are theoretically possible. The patient was educated about the of the procedure and alternative therapies. All questions were answered and the patient freely gave consent to proceed. Indication for Fluoroscopy:  This procedure requires the precise placement of the spinal needle into the epidural space.  It is the only way to accurately and safely perform the injection.    Procedure Note:  After obtaining written consent, the patient was placed on the fluoroscopic table in the prone position with the pillow placed under the hips. A chloraprep was performed and the area surrounded by sterile drapes. A time out was performed.  Fluoroscopy unit was positioned over the patient and images of the spine (AP and oblique views) obtained.  The entry sites for the above noted levels median branch were determined and local anesthesia with lidocaine 2%-1cc was provided. At each level a 22guage 3  inch spinal needle was placed at the level of the median branch to the facet under fluoroscopic guidance.  A dose of Lidocaine 2% 0.5cc was administered at each level the needles at each level were withdrawn following administration of the medication.  There was no significant bleeding at the site.  A dressing was placed by the assistant nurse.    Findings: Lumbar spine AP and oblique views with x-ray degenerative changes noted.   Complications: none.   Disposition: : I have examined the patient and there are no new physical findings since the original presentation.  Sensory and motor function were intact. The patient met discharge criteria see nurses notes. The discharge instruction sheet was reviewed and given to the patient. The patient was discharged home with a .  Comment:. If at least 70% relief or 50% greater than 24 hours, would proceed to RFA. If 50% relief is less than 24 hours, would repeat to confirm for RFA. If less than 50% relief, assess for other pain generators. Call if any problems  Indication for RFA: The pt had a positive response to medial branch diagnostic injections and is considered a good candidate for the thermal RF ablation procedure. The diagnostic injection provided at least 50% reduction in pain for the duration of the local anesthetic.    The following criteria have been met: 1) failed response to at least 3 months of conservative management; 2) patient has LBP that is non-radicular, suggesting facet joint origin supported by absence of nerve root compression documented on the medical record on H&P and radiographic evaluation; 3) minimum of 6 months has elapsed since any prior RF treatments. If prior ablation therapy has been performed, it provided at least 50% relief for minimum of 10-12 weeks; 4) no prior spinal fusion at the vertebral level being treated;   Trey Frey MD  Board Certified, Anesthesiology  Board Certified, Pain Medicine

## 2023-09-12 ENCOUNTER — APPOINTMENT (OUTPATIENT)
Dept: PAIN MANAGEMENT | Facility: CLINIC | Age: 38
End: 2023-09-12
Payer: OTHER MISCELLANEOUS

## 2023-09-12 VITALS — BODY MASS INDEX: 39.76 KG/M2 | WEIGHT: 300 LBS | HEIGHT: 73 IN

## 2023-09-12 DIAGNOSIS — M51.36 OTHER INTERVERTEBRAL DISC DEGENERATION, LUMBAR REGION: ICD-10-CM

## 2023-09-12 PROCEDURE — 99455 WORK RELATED DISABILITY EXAM: CPT

## 2023-09-13 ENCOUNTER — APPOINTMENT (OUTPATIENT)
Dept: ORTHOPEDIC SURGERY | Facility: CLINIC | Age: 38
End: 2023-09-13
Payer: OTHER MISCELLANEOUS

## 2023-09-13 PROCEDURE — 99203 OFFICE O/P NEW LOW 30 MIN: CPT

## 2023-09-27 ENCOUNTER — APPOINTMENT (OUTPATIENT)
Dept: PAIN MANAGEMENT | Facility: CLINIC | Age: 38
End: 2023-09-27
Payer: OTHER MISCELLANEOUS

## 2023-09-27 VITALS
BODY MASS INDEX: 39.76 KG/M2 | DIASTOLIC BLOOD PRESSURE: 93 MMHG | WEIGHT: 300 LBS | SYSTOLIC BLOOD PRESSURE: 136 MMHG | HEIGHT: 73 IN | HEART RATE: 81 BPM

## 2023-09-27 PROCEDURE — 99215 OFFICE O/P EST HI 40 MIN: CPT

## 2023-10-26 ENCOUNTER — APPOINTMENT (OUTPATIENT)
Dept: PAIN MANAGEMENT | Facility: CLINIC | Age: 38
End: 2023-10-26
Payer: OTHER MISCELLANEOUS

## 2023-10-26 PROCEDURE — 93040 RHYTHM ECG WITH REPORT: CPT | Mod: 59

## 2023-10-26 PROCEDURE — 27096 INJECT SACROILIAC JOINT: CPT | Mod: LT

## 2023-10-26 PROCEDURE — 93770 DETERMINATION VENOUS PRESS: CPT

## 2023-10-26 PROCEDURE — 99152Z: CUSTOM

## 2023-10-26 PROCEDURE — 01992 ANES DX/THER NRV BLK&INJ PRN: CPT | Mod: QZ,P2

## 2023-10-26 PROCEDURE — 72200 X-RAY EXAM SI JOINTS: CPT

## 2023-11-06 ENCOUNTER — APPOINTMENT (OUTPATIENT)
Dept: PAIN MANAGEMENT | Facility: CLINIC | Age: 38
End: 2023-11-06
Payer: OTHER MISCELLANEOUS

## 2023-11-06 VITALS
HEART RATE: 85 BPM | WEIGHT: 300 LBS | DIASTOLIC BLOOD PRESSURE: 84 MMHG | SYSTOLIC BLOOD PRESSURE: 128 MMHG | BODY MASS INDEX: 39.76 KG/M2 | HEIGHT: 73 IN

## 2023-11-06 PROCEDURE — 99214 OFFICE O/P EST MOD 30 MIN: CPT

## 2023-11-15 ENCOUNTER — APPOINTMENT (OUTPATIENT)
Dept: PAIN MANAGEMENT | Facility: CLINIC | Age: 38
End: 2023-11-15
Payer: OTHER MISCELLANEOUS

## 2023-11-15 PROCEDURE — 64636Z: CUSTOM

## 2023-11-15 PROCEDURE — 72100 X-RAY EXAM L-S SPINE 2/3 VWS: CPT

## 2023-11-15 PROCEDURE — 01992 ANES DX/THER NRV BLK&INJ PRN: CPT | Mod: QZ,P1

## 2023-11-15 PROCEDURE — 93770 DETERMINATION VENOUS PRESS: CPT

## 2023-11-15 PROCEDURE — 99152Z: CUSTOM

## 2023-11-15 PROCEDURE — 93040 RHYTHM ECG WITH REPORT: CPT | Mod: 59

## 2023-11-15 PROCEDURE — 64635 DESTROY LUMB/SAC FACET JNT: CPT | Mod: LT

## 2023-12-14 ENCOUNTER — APPOINTMENT (OUTPATIENT)
Dept: PAIN MANAGEMENT | Facility: CLINIC | Age: 38
End: 2023-12-14

## 2023-12-26 ENCOUNTER — APPOINTMENT (OUTPATIENT)
Dept: PAIN MANAGEMENT | Facility: CLINIC | Age: 38
End: 2023-12-26

## 2024-01-26 ENCOUNTER — APPOINTMENT (OUTPATIENT)
Dept: PAIN MANAGEMENT | Facility: CLINIC | Age: 39
End: 2024-01-26
Payer: OTHER MISCELLANEOUS

## 2024-01-26 VITALS — BODY MASS INDEX: 39.76 KG/M2 | HEIGHT: 73 IN | WEIGHT: 300 LBS

## 2024-01-26 DIAGNOSIS — M53.3 SACROCOCCYGEAL DISORDERS, NOT ELSEWHERE CLASSIFIED: ICD-10-CM

## 2024-01-26 PROCEDURE — 99214 OFFICE O/P EST MOD 30 MIN: CPT

## 2024-01-26 RX ORDER — METHYLPREDNISOLONE 4 MG/1
4 TABLET ORAL
Qty: 1 | Refills: 0 | Status: ACTIVE | COMMUNITY
Start: 2024-01-26 | End: 1900-01-01

## 2024-01-26 RX ORDER — MELOXICAM 15 MG/1
15 TABLET ORAL DAILY
Qty: 14 | Refills: 0 | Status: ACTIVE | COMMUNITY
Start: 2024-01-26 | End: 1900-01-01

## 2024-01-26 NOTE — DISCUSSION/SUMMARY
[de-identified] : A discussion regarding available pain management treatment options occurred with the patient. These included interventional, rehabilitative, pharmacological, and alternative modalities. We will proceed with the following:   Interventional treatment options: Deferred at this time.    Rehabilitative options: -Participation in HEP were discussed and printed. I gave the patient a list of home exercises to do for pain reduction and overall improvement in functional status including but not limited to active neck rotation, active neck side bend, neck flexion, neck extension, chin tuck, scalene stretch, isometric neck flexion, isometric neck extension, isometric neck side bend, head lift/neck curl, head lift/neck side bend, neck extension on hands and knees, and scapula squeeze.   Medication based treatment options: - ordered a Medrol Dose Pack 4mg, use as directed for a duration of 6 days. - ordered Meloxicam 15mg daily for 4 weeks. Discussed risks and benefits. Avoid taking for any side effects. patient is aware to take for 2 weeks straight than take 1 week off before repeating.   Complementary treatment options: - Patient was advised to avoid bending and lifting heavy objects.   Follow up in 4-6 weeks for reassessment.   I, Jimmy Jeong, attest that this documentation has been prepared under the direction and in the presence of Provider Marcus De La Rosa, DO   The documentation recorded by the scribe, in my presence, accurately reflects the service I personally performed, and the decisions made by me with my edits as appropriate.   Best Regards, Marcus De La Rosa D.O.

## 2024-01-26 NOTE — HISTORY OF PRESENT ILLNESS
[FreeTextEntry1] : HISTORY OF PRESENT ILLNESS, ORIGINAL: Mr. Juan is a 35 year old male presenting to establish care for his lower back pain. The pain started after a work related injury. 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 Since then, he has not returned to work. He has seen neurology and has had multiple medications with very minimal relief. He states the pain is severe, constant and rated 10/10 on the pain scale. He states the pain is associated with numbness and tingling into his right thigh as well as his lower extremities. He states he is unable to stand sit or walk. The patient has had this pain for 8 months. Patient describes the back pain as moderate to severe. During the last month the pain has been nearly constant with symptoms worsening evening. Pain described as sharp, pressure-like, dull/aching, throbbing. Pain is increased with standing, walking, exercising. Pain is decreased with lying down, relaxing. Pain is not changed with sitting, coughing/sneezing and bowel movements. Bowel or bladder habits have not changed.  TODAY: Revisit encounter 01-. 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 He is most recently status post a left medial nerve branch Radiofrequency Ablation for sacroiliac denervation under fluoroscopy on 11/15/23 which provided him with about 50% sustained relief for a few weeks.   He underwent a left sacroiliac joint injection on 10-. He notes approximately 90% relief for 2 weeks.  Improvement in pain as well as increase in function.  He states he was able to stand and walk better.  Currently, his pain is returned.  He currently rates pain as an 8 out of 10 on the pain scale.  He states pain travels into the right buttock and groin.  States pain is worse with sitting.  He is doing home exercises. Pain is described as sharp, pressure-like, dull/aching, throbbing. Pain is increased with standing, walking, exercising. He notes tightness and stiffness across his lower back.

## 2024-01-26 NOTE — PHYSICAL EXAM
[de-identified] : BACK - tenderness over the SIJ. ROM restricted. Pain with extension. Positive PSIS. Positive patricks test. Positive thigh thrust. (on the left)

## 2024-02-26 ENCOUNTER — APPOINTMENT (OUTPATIENT)
Dept: PAIN MANAGEMENT | Facility: CLINIC | Age: 39
End: 2024-02-26
Payer: OTHER MISCELLANEOUS

## 2024-02-26 DIAGNOSIS — M54.16 RADICULOPATHY, LUMBAR REGION: ICD-10-CM

## 2024-02-26 DIAGNOSIS — M48.07 SPINAL STENOSIS, LUMBOSACRAL REGION: ICD-10-CM

## 2024-02-26 PROCEDURE — 99214 OFFICE O/P EST MOD 30 MIN: CPT

## 2024-02-26 RX ORDER — PREGABALIN 75 MG/1
75 CAPSULE ORAL
Qty: 2 | Refills: 0 | Status: ACTIVE | COMMUNITY
Start: 2024-02-26 | End: 1900-01-01

## 2024-03-25 ENCOUNTER — APPOINTMENT (OUTPATIENT)
Dept: PAIN MANAGEMENT | Facility: CLINIC | Age: 39
End: 2024-03-25

## 2024-06-29 ENCOUNTER — NON-APPOINTMENT (OUTPATIENT)
Age: 39
End: 2024-06-29

## 2024-06-29 ENCOUNTER — APPOINTMENT (OUTPATIENT)
Dept: ORTHOPEDIC SURGERY | Facility: CLINIC | Age: 39
End: 2024-06-29
Payer: OTHER MISCELLANEOUS

## 2024-06-29 VITALS — HEIGHT: 73 IN | WEIGHT: 300 LBS | BODY MASS INDEX: 39.76 KG/M2

## 2024-06-29 DIAGNOSIS — S99.911A UNSPECIFIED INJURY OF RIGHT ANKLE, INITIAL ENCOUNTER: ICD-10-CM

## 2024-06-29 PROCEDURE — 99213 OFFICE O/P EST LOW 20 MIN: CPT | Mod: ACP,25

## 2024-06-29 PROCEDURE — 73610 X-RAY EXAM OF ANKLE: CPT | Mod: RT

## 2024-06-29 PROCEDURE — L4361: CPT | Mod: RT

## 2024-07-01 ENCOUNTER — APPOINTMENT (OUTPATIENT)
Dept: ORTHOPEDIC SURGERY | Facility: CLINIC | Age: 39
End: 2024-07-01
Payer: OTHER MISCELLANEOUS

## 2024-07-01 PROBLEM — S86.011A RUPTURE OF RIGHT ACHILLES TENDON, INITIAL ENCOUNTER: Status: ACTIVE | Noted: 2024-07-01

## 2024-07-01 PROCEDURE — 99214 OFFICE O/P EST MOD 30 MIN: CPT

## 2024-07-10 ENCOUNTER — TRANSCRIPTION ENCOUNTER (OUTPATIENT)
Age: 39
End: 2024-07-10

## 2024-07-10 RX ORDER — CEPHALEXIN 500 MG/1
500 CAPSULE ORAL 4 TIMES DAILY
Qty: 20 | Refills: 0 | Status: ACTIVE | COMMUNITY
Start: 2024-07-10 | End: 1900-01-01

## 2024-07-10 RX ORDER — ACETAMINOPHEN 500 MG/1
500 TABLET, COATED ORAL
Qty: 40 | Refills: 0 | Status: ACTIVE | COMMUNITY
Start: 2024-07-10 | End: 1900-01-01

## 2024-07-10 RX ORDER — ASPIRIN 81 MG/1
81 TABLET, COATED ORAL
Qty: 60 | Refills: 0 | Status: ACTIVE | COMMUNITY
Start: 2024-07-10 | End: 1900-01-01

## 2024-07-10 RX ORDER — ONDANSETRON 4 MG/1
4 TABLET, ORALLY DISINTEGRATING ORAL 3 TIMES DAILY
Qty: 21 | Refills: 0 | Status: ACTIVE | COMMUNITY
Start: 2024-07-10 | End: 1900-01-01

## 2024-07-10 RX ORDER — OXYCODONE 5 MG/1
5 TABLET ORAL
Qty: 30 | Refills: 0 | Status: ACTIVE | COMMUNITY
Start: 2024-07-10 | End: 1900-01-01

## 2024-07-11 ENCOUNTER — APPOINTMENT (OUTPATIENT)
Dept: ORTHOPEDIC SURGERY | Facility: HOSPITAL | Age: 39
End: 2024-07-11

## 2024-07-11 ENCOUNTER — OUTPATIENT (OUTPATIENT)
Dept: OUTPATIENT SERVICES | Facility: HOSPITAL | Age: 39
LOS: 1 days | Discharge: HOME | End: 2024-07-11
Payer: COMMERCIAL

## 2024-07-11 VITALS
HEIGHT: 73 IN | TEMPERATURE: 98 F | DIASTOLIC BLOOD PRESSURE: 83 MMHG | OXYGEN SATURATION: 99 % | WEIGHT: 300.05 LBS | HEART RATE: 75 BPM | RESPIRATION RATE: 16 BRPM | SYSTOLIC BLOOD PRESSURE: 138 MMHG

## 2024-07-11 DIAGNOSIS — S86.011A STRAIN OF RIGHT ACHILLES TENDON, INITIAL ENCOUNTER: ICD-10-CM

## 2024-07-11 DIAGNOSIS — Z98.890 OTHER SPECIFIED POSTPROCEDURAL STATES: Chronic | ICD-10-CM

## 2024-07-11 PROCEDURE — C1713: CPT

## 2024-07-11 PROCEDURE — C9399: CPT

## 2024-07-11 PROCEDURE — 27650 REPAIR ACHILLES TENDON: CPT | Mod: RT

## 2024-07-11 RX ORDER — HYDROMORPHONE HCL 0.2 MG/ML
0.5 INJECTION, SOLUTION INTRAVENOUS
Refills: 0 | Status: DISCONTINUED | OUTPATIENT
Start: 2024-07-11 | End: 2024-07-12

## 2024-07-11 RX ORDER — PREGABALIN 50 MG/1
0 CAPSULE ORAL
Refills: 0 | DISCHARGE

## 2024-07-11 RX ORDER — MELOXICAM 7.5 MG/1
0 TABLET ORAL
Refills: 0 | DISCHARGE

## 2024-07-11 RX ORDER — ASPIRIN 325 MG/1
0 TABLET, FILM COATED ORAL
Refills: 0 | DISCHARGE

## 2024-07-12 VITALS
RESPIRATION RATE: 17 BRPM | DIASTOLIC BLOOD PRESSURE: 60 MMHG | OXYGEN SATURATION: 97 % | HEART RATE: 72 BPM | SYSTOLIC BLOOD PRESSURE: 126 MMHG

## 2024-07-18 DIAGNOSIS — S86.011A STRAIN OF RIGHT ACHILLES TENDON, INITIAL ENCOUNTER: ICD-10-CM

## 2024-07-19 DIAGNOSIS — Z91.040 LATEX ALLERGY STATUS: ICD-10-CM
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
## 2024-07-19 DIAGNOSIS — S86.011A STRAIN OF RIGHT ACHILLES TENDON, INITIAL ENCOUNTER: ICD-10-CM
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
## 2024-07-19 DIAGNOSIS — G47.33 OBSTRUCTIVE SLEEP APNEA (ADULT) (PEDIATRIC): ICD-10-CM

## 2024-07-29 ENCOUNTER — APPOINTMENT (OUTPATIENT)
Dept: ORTHOPEDIC SURGERY | Facility: CLINIC | Age: 39
End: 2024-07-29
Payer: OTHER MISCELLANEOUS

## 2024-07-29 DIAGNOSIS — S86.011A STRAIN OF RIGHT ACHILLES TENDON, INITIAL ENCOUNTER: ICD-10-CM

## 2024-07-29 PROCEDURE — 99024 POSTOP FOLLOW-UP VISIT: CPT

## 2024-07-30 NOTE — PHYSICAL EXAM
[de-identified] : He is alert oriented x 3.  Pleasant cooperative.  I examined his right lower extremity.  His incision is well-healed.  No evidence of infection.  Achilles repair is intact and stable.  Normal Ruiz's test.  Neurovascular intact.

## 2024-07-30 NOTE — DISCUSSION/SUMMARY
[de-identified] : Sutures removed and Steri-Strips placed.  Patient was placed into a cam boot with 3 heel wedges.  He will remove 1 heel wedge in 2 weeks.  I will see him back in 1 month.  All questions sought and answered.  He can weight-bear as tolerated with the Achilles boot on.

## 2024-07-30 NOTE — HISTORY OF PRESENT ILLNESS
[de-identified] : Patient here for postop exam.  He is proximately 2 weeks status post right Achilles tendon repair.  He is doing well.  Minimal pain.  Does not endorse any calf pain chest pain or shortness of breath.

## 2024-08-26 ENCOUNTER — APPOINTMENT (OUTPATIENT)
Dept: ORTHOPEDIC SURGERY | Facility: CLINIC | Age: 39
End: 2024-08-26
Payer: OTHER MISCELLANEOUS

## 2024-08-26 DIAGNOSIS — S86.011A STRAIN OF RIGHT ACHILLES TENDON, INITIAL ENCOUNTER: ICD-10-CM

## 2024-08-26 PROCEDURE — 99024 POSTOP FOLLOW-UP VISIT: CPT

## 2024-08-29 NOTE — HISTORY OF PRESENT ILLNESS
[de-identified] : Patient is now 6 weeks status post an Achilles tendon repair of the right ankle.  He is doing well.  He is weightbearing with the cam boot on.

## 2024-08-29 NOTE — DISCUSSION/SUMMARY
[de-identified] : Patient will begin physical therapy at this point.  Avoid dorsiflexion past neutral.  Remove 1 more wedge today and another 1 in 2 weeks.  I will see him back in 1 month.  All questions answered.

## 2024-08-29 NOTE — PHYSICAL EXAM
[de-identified] : His incision is well-healed.  The Achilles tendon is healing nicely with no evidence of interval rupture.  Normal Ruiz's test.  He is minimally tender over the calcaneal screw sites.  He is neurovascular intact distally.

## 2024-09-06 ENCOUNTER — NON-APPOINTMENT (OUTPATIENT)
Age: 39
End: 2024-09-06

## 2024-09-23 ENCOUNTER — NON-APPOINTMENT (OUTPATIENT)
Age: 39
End: 2024-09-23

## 2024-09-23 ENCOUNTER — APPOINTMENT (OUTPATIENT)
Dept: ORTHOPEDIC SURGERY | Facility: CLINIC | Age: 39
End: 2024-09-23
Payer: OTHER MISCELLANEOUS

## 2024-09-23 DIAGNOSIS — S86.011A STRAIN OF RIGHT ACHILLES TENDON, INITIAL ENCOUNTER: ICD-10-CM

## 2024-09-23 PROCEDURE — 99024 POSTOP FOLLOW-UP VISIT: CPT

## 2024-09-24 NOTE — DISCUSSION/SUMMARY
[de-identified] : Initiate physical therapy.  He can wean off the boot into a heel wedge in a sneaker.  I will see him back in 1 month.  He remains 100% temporarily disabled from his workplace injury.

## 2024-09-24 NOTE — HISTORY OF PRESENT ILLNESS
[de-identified] : [id number] is here for postop exam.  He is doing well.  Not yet had a chance to start physical therapy.

## 2024-09-24 NOTE — PHYSICAL EXAM
[de-identified] : Incision is well-healed.  Full continuity of the Achilles tendon is observed.  Grossly intact range of motion.  Normal Ruiz's test.

## 2024-10-30 ENCOUNTER — NON-APPOINTMENT (OUTPATIENT)
Age: 39
End: 2024-10-30

## 2024-10-30 ENCOUNTER — APPOINTMENT (OUTPATIENT)
Dept: ORTHOPEDIC SURGERY | Facility: CLINIC | Age: 39
End: 2024-10-30
Payer: OTHER MISCELLANEOUS

## 2024-10-30 DIAGNOSIS — S86.011A STRAIN OF RIGHT ACHILLES TENDON, INITIAL ENCOUNTER: ICD-10-CM

## 2024-10-30 PROCEDURE — 99213 OFFICE O/P EST LOW 20 MIN: CPT

## 2025-01-08 ENCOUNTER — APPOINTMENT (OUTPATIENT)
Dept: ORTHOPEDIC SURGERY | Facility: CLINIC | Age: 40
End: 2025-01-08
Payer: OTHER MISCELLANEOUS

## 2025-01-08 ENCOUNTER — NON-APPOINTMENT (OUTPATIENT)
Age: 40
End: 2025-01-08

## 2025-01-08 DIAGNOSIS — S86.011A STRAIN OF RIGHT ACHILLES TENDON, INITIAL ENCOUNTER: ICD-10-CM

## 2025-01-08 PROCEDURE — 99213 OFFICE O/P EST LOW 20 MIN: CPT

## 2025-02-12 ENCOUNTER — NON-APPOINTMENT (OUTPATIENT)
Age: 40
End: 2025-02-12

## 2025-02-12 ENCOUNTER — APPOINTMENT (OUTPATIENT)
Dept: ORTHOPEDIC SURGERY | Facility: CLINIC | Age: 40
End: 2025-02-12
Payer: OTHER MISCELLANEOUS

## 2025-02-12 DIAGNOSIS — S86.011A STRAIN OF RIGHT ACHILLES TENDON, INITIAL ENCOUNTER: ICD-10-CM

## 2025-02-12 PROCEDURE — 99213 OFFICE O/P EST LOW 20 MIN: CPT

## 2025-03-26 ENCOUNTER — NON-APPOINTMENT (OUTPATIENT)
Age: 40
End: 2025-03-26

## 2025-03-26 ENCOUNTER — APPOINTMENT (OUTPATIENT)
Dept: ORTHOPEDIC SURGERY | Facility: CLINIC | Age: 40
End: 2025-03-26
Payer: OTHER MISCELLANEOUS

## 2025-03-26 DIAGNOSIS — S86.011A STRAIN OF RIGHT ACHILLES TENDON, INITIAL ENCOUNTER: ICD-10-CM

## 2025-03-26 PROCEDURE — 99213 OFFICE O/P EST LOW 20 MIN: CPT

## 2025-05-07 ENCOUNTER — APPOINTMENT (OUTPATIENT)
Dept: ORTHOPEDIC SURGERY | Facility: CLINIC | Age: 40
End: 2025-05-07
Payer: OTHER MISCELLANEOUS

## 2025-05-07 ENCOUNTER — NON-APPOINTMENT (OUTPATIENT)
Age: 40
End: 2025-05-07

## 2025-05-07 DIAGNOSIS — S86.011A STRAIN OF RIGHT ACHILLES TENDON, INITIAL ENCOUNTER: ICD-10-CM

## 2025-05-07 PROCEDURE — 99213 OFFICE O/P EST LOW 20 MIN: CPT

## 2025-07-09 ENCOUNTER — APPOINTMENT (OUTPATIENT)
Dept: ORTHOPEDIC SURGERY | Facility: CLINIC | Age: 40
End: 2025-07-09
Payer: OTHER MISCELLANEOUS

## 2025-07-09 PROCEDURE — 99243 OFF/OP CNSLTJ NEW/EST LOW 30: CPT
